# Patient Record
Sex: FEMALE | Race: WHITE | NOT HISPANIC OR LATINO | Employment: FULL TIME | URBAN - METROPOLITAN AREA
[De-identification: names, ages, dates, MRNs, and addresses within clinical notes are randomized per-mention and may not be internally consistent; named-entity substitution may affect disease eponyms.]

---

## 2020-12-03 ENCOUNTER — OFFICE VISIT (OUTPATIENT)
Dept: OBSTETRICS AND GYNECOLOGY | Facility: CLINIC | Age: 40
End: 2020-12-03

## 2020-12-03 VITALS
TEMPERATURE: 97.3 F | DIASTOLIC BLOOD PRESSURE: 60 MMHG | BODY MASS INDEX: 23.22 KG/M2 | WEIGHT: 136 LBS | HEIGHT: 64 IN | SYSTOLIC BLOOD PRESSURE: 98 MMHG

## 2020-12-03 DIAGNOSIS — Z30.430 ENCOUNTER FOR IUD INSERTION: Primary | ICD-10-CM

## 2020-12-03 LAB
B-HCG UR QL: NEGATIVE
INTERNAL NEGATIVE CONTROL: NEGATIVE
INTERNAL POSITIVE CONTROL: POSITIVE
Lab: NORMAL

## 2020-12-03 PROCEDURE — 58300 INSERT INTRAUTERINE DEVICE: CPT | Performed by: OBSTETRICS & GYNECOLOGY

## 2020-12-03 PROCEDURE — 58301 REMOVE INTRAUTERINE DEVICE: CPT | Performed by: OBSTETRICS & GYNECOLOGY

## 2020-12-03 PROCEDURE — 81025 URINE PREGNANCY TEST: CPT | Performed by: OBSTETRICS & GYNECOLOGY

## 2020-12-03 RX ORDER — ADALIMUMAB 40MG/0.8ML
KIT SUBCUTANEOUS
COMMUNITY
Start: 2020-11-02

## 2020-12-03 NOTE — PROGRESS NOTES
Procedure: IUD Removal and Reinsertion Procedure Note     Remove & Insert Drug Implant IUD    Date/Time: 12/3/2020 2:57 PM  Performed by: Allan Gage MD  Authorized by: Allan Gage MD   Preparation: Patient was prepped and draped in the usual sterile fashion.  Local anesthesia used: no    Anesthesia:  Local anesthesia used: no    Sedation:  Patient sedated: no    Patient tolerance: patient tolerated the procedure well with no immediate complications          Pre procedure Dx : 1) Removal of IUD                                    2) Insertion of New IUD    Post procedure Dx : 1) Removal of IUD                                    2) Insertion of New IUD      The risks, benefits, and alternatives to Kyleena and IUD removal were explained at length with the patient. All her questions were answered and consents were signed.    The patient was placed in a dorsal lithotomy position on the examining table in [facility]. A bimanual exam confirmed the uterus was normal in size, anteverted. A warmed metal speculum was inserted into the vagina and the cervix was brought into view.    Type of IUD:  Chelsy   Date of insertion:  Unknown, possibly 5 years ago  Reason for removal:  Device expiration    Procedure Time Out Documentation    Procedure Details  IUD strings visible:  yes  Removal:  IUD strings grasped and IUD removed intact with gentle traction.  The patient tolerated the procedure well.    Plans for contraception: New IUD     IUD used: Kyleena  NDC: Kyleena  [id number]  Lot #: [id number]  Exp Date: 06/2022  BH device    The cervix was prepped with Betadine. The anterior lip was grasped with a single-tooth tenaculum. The endometrial cavity was then sounded to 7 cm without use of a dilator. This sealed Kyleena package was opened and the IUD was removed in a sterile fashion.    The upper edge of the depth setting the flange was set at a uterine sound measured. The  was then carefully advanced to the  cervical canal into the uterus to the level of the fundus.  The slider was then retracted about 1 cm and deployed the device. The device was then gently advanced to the fundus. The IUD was then released by pulling the slider down all the way. The  was removed carefully from the uterus. The threads were then cut leaving 2-3 cm visible outside of the cervix.  The single-tooth tenaculum was removed from the anterior lip. Good hemostasis was noted.     All other instruments were removed from the vagina.   There were no complications.  The patient tolerated the procedure well with a minimal amount of discomfort.    The patient was counseled about the need to return in 4 weeks with U/S for IUD check.     She was counseled about the need to use a backup method of contraception such as condoms until her post insertion exam was performed. The patient verbalized understanding that the Kyleena will need to be removed/replaced after 5 years. The patient is counseled to contact us if she has any significant or increasing bleeding, pain, fever, chills, or other concerns. She is instructed to see a doctor right away if she believes that she may be pregnant at any time with the IUD in place.    Allan Gage MD  12/03/2020

## 2020-12-31 ENCOUNTER — OFFICE VISIT (OUTPATIENT)
Dept: OBSTETRICS AND GYNECOLOGY | Facility: CLINIC | Age: 40
End: 2020-12-31

## 2020-12-31 VITALS
BODY MASS INDEX: 23.9 KG/M2 | SYSTOLIC BLOOD PRESSURE: 98 MMHG | WEIGHT: 140 LBS | DIASTOLIC BLOOD PRESSURE: 62 MMHG | TEMPERATURE: 97.3 F | HEIGHT: 64 IN

## 2020-12-31 DIAGNOSIS — Z30.430 ENCOUNTER FOR IUD INSERTION: Primary | ICD-10-CM

## 2020-12-31 PROCEDURE — 99212 OFFICE O/P EST SF 10 MIN: CPT | Performed by: OBSTETRICS & GYNECOLOGY

## 2020-12-31 NOTE — PROGRESS NOTES
"Chief Complaint   Patient presents with   • IUD check         Subjective   HPI  Gisele Escobar is a 40 y.o. female, , who presents for IUD check follow up.  She had an IUD placed 4 weeks ago.  Her LMP was No LMP recorded (lmp unknown). Patient has had an implant..  Since the IUD placement, the patient has not had any unusual complaints. Additional complaints include none.    The additional following portions of the patient's history were reviewed and updated as appropriate: allergies, current medications, past family history, past medical history, past social history, past surgical history and problem list.    Did the patient have u/s today? No.    Review of Systems   Constitutional: Negative.    HENT: Negative.    Eyes: Negative.    Respiratory: Negative.    Cardiovascular: Negative.    Gastrointestinal: Negative.    Endocrine: Negative.    Genitourinary: Negative.  Negative for menstrual problem, pelvic pain, pelvic pressure and vaginal bleeding.   Musculoskeletal: Negative.    Skin: Negative.    Allergic/Immunologic: Negative.    Neurological: Negative.    Hematological: Negative.    Psychiatric/Behavioral: Negative.      All other systems reviewed and are negative.     I have reviewed and agree with the HPI, ROS, and historical information as entered above. Allan Gage MD    Objective   BP 98/62   Temp 97.3 °F (36.3 °C)   Ht 162.6 cm (64\")   Wt 63.5 kg (140 lb)   LMP  (LMP Unknown)   Breastfeeding No   BMI 24.03 kg/m²     Physical Exam  Vitals signs and nursing note reviewed.   Constitutional:       Appearance: Normal appearance.   Pulmonary:      Effort: Pulmonary effort is normal.   Abdominal:      General: Abdomen is flat.      Palpations: Abdomen is soft.   Genitourinary:     Vagina: Normal.      Cervix: Normal.      Uterus: Normal.       Adnexa: Right adnexa normal and left adnexa normal.      Comments: IUD string 3 cm.  Neurological:      Mental Status: She is alert and oriented to " person, place, and time.   Psychiatric:         Behavior: Behavior normal.         Assessment/Plan     Assessment     Problem List Items Addressed This Visit     Encounter for IUD insertion - Primary    Overview     Removal of Chelsy and insertion of Kyleena IUD on 12/3/2020.  Due for removal 12/2025.         Relevant Medications    levonorgestrel (KYLEENA) 19.5 MG IUD 1 each          1. IUD checkup.  No problems since Kyleena IUD insertion.  String is 3 cm long.    Plan     1. Return to office PRN  Return in 6 months (on 6/30/2021), or if symptoms worsen or fail to improve, for Annual physical.      Allan Gage MD  12/31/2020

## 2021-06-17 ENCOUNTER — OFFICE VISIT (OUTPATIENT)
Dept: OBSTETRICS AND GYNECOLOGY | Facility: CLINIC | Age: 41
End: 2021-06-17

## 2021-06-17 VITALS
DIASTOLIC BLOOD PRESSURE: 64 MMHG | TEMPERATURE: 97.7 F | WEIGHT: 128 LBS | HEIGHT: 64 IN | BODY MASS INDEX: 21.85 KG/M2 | SYSTOLIC BLOOD PRESSURE: 90 MMHG

## 2021-06-17 DIAGNOSIS — N89.8 VAGINAL DISCHARGE: ICD-10-CM

## 2021-06-17 DIAGNOSIS — Z00.00 ANNUAL PHYSICAL EXAM: Primary | ICD-10-CM

## 2021-06-17 DIAGNOSIS — B96.89 BV (BACTERIAL VAGINOSIS): ICD-10-CM

## 2021-06-17 DIAGNOSIS — N76.0 BV (BACTERIAL VAGINOSIS): ICD-10-CM

## 2021-06-17 LAB
KOH PREP NAIL: NORMAL
WET PREP GENITAL: POSITIVE

## 2021-06-17 PROCEDURE — 87220 TISSUE EXAM FOR FUNGI: CPT | Performed by: NURSE PRACTITIONER

## 2021-06-17 PROCEDURE — 99395 PREV VISIT EST AGE 18-39: CPT | Performed by: NURSE PRACTITIONER

## 2021-06-17 PROCEDURE — 87210 SMEAR WET MOUNT SALINE/INK: CPT | Performed by: NURSE PRACTITIONER

## 2021-06-17 RX ORDER — METRONIDAZOLE 500 MG/1
500 TABLET ORAL 2 TIMES DAILY
Qty: 14 TABLET | Refills: 0 | Status: SHIPPED | OUTPATIENT
Start: 2021-06-17 | End: 2021-06-24

## 2021-06-17 NOTE — PROGRESS NOTES
GYN Annual Exam     CC - Here for annual exam.        HPI  Gisele DALJIT Escobar is a 41 y.o. female, , who presents for annual well woman exam. No LMP recorded (lmp unknown). (Menstrual status: Other)..  She has no periods..  Dysmenorrhea:None.  .  There were no changes to her medical or surgical history since her last visit.. Partner Status: Marital Status: single.  She is sexually active. She has not had new partners since her last STD testing. She does desire STD testing. .    Additional OB/GYN History   Current contraception: contraceptive methods: IUD.  Insertion date: 20  Desires to: do not start contraception  Last Pap :   Last Completed Pap Smear          Ordered - PAP SMEAR (Every 3 Years) Ordered on 2021    06/10/2020  Done - ASCUS, HPV negative    10/27/2014  SCANNED - PAP SMEAR              History of abnormal Pap smear: no  Family history of uterine, colon, breast, or ovarian cancer: no  Performs monthly Self-Breast Exam: yes  Exercises Regularly:yes  Feelings of Anxiety or Depression: yes - both  Tobacco Usage?: No   OB History        1    Para        Term                AB   1    Living           SAB   1    TAB        Ectopic        Molar        Multiple        Live Births   0                Health Maintenance   Topic Date Due   • ANNUAL PHYSICAL  Never done   • TDAP/TD VACCINES (1 - Tdap) Never done   • Annual Gynecologic Pelvic and Breast Exam  10/28/2015   • HEPATITIS C SCREENING  Never done   • INFLUENZA VACCINE  2021   • PAP SMEAR  06/10/2023   • COVID-19 Vaccine  Completed   • Pneumococcal Vaccine 0-64  Aged Out       The additional following portions of the patient's history were reviewed and updated as appropriate: problem list.    Review of Systems   Constitutional: Negative.    Gastrointestinal: Negative.    Genitourinary: Negative.    Psychiatric/Behavioral: Negative.          I have reviewed and agree with the HPI, ROS, and historical information as  "entered above. Ashley Hernandez, APRN    Objective   BP 90/64   Temp 97.7 °F (36.5 °C)   Ht 162.6 cm (64\")   Wt 58.1 kg (128 lb)   LMP  (LMP Unknown)   BMI 21.97 kg/m²     Physical Exam  Exam conducted with a chaperone present.   Constitutional:       Appearance: Normal appearance. She is normal weight.   Cardiovascular:      Rate and Rhythm: Normal rate and regular rhythm.   Chest:      Breasts:         Right: Normal.         Left: Normal.   Abdominal:      Palpations: Abdomen is soft.   Genitourinary:     General: Normal vulva.      Vagina: Vaginal discharge present.      Cervix: Normal.      Uterus: Normal.       Adnexa: Right adnexa normal and left adnexa normal.      Rectum: Normal.      Comments: IUD strings visualized on exam    Thin white vaginal discharge   Neurological:      Mental Status: She is alert.            Assessment and Plan    Problem List Items Addressed This Visit     None      Visit Diagnoses     Annual physical exam    -  Primary    Relevant Orders    Pap IG, Ct-Ng TV Rfx HPV All          1. GYN annual well woman exam.   2. Doing well with Kyleena IUD  (insertion 12/2020)  3. + clue cells on wet prep, Rx flagyl as prescribed  4. Reviewed monthly self breast exams.  Instructed to call with lumps, pain, or breast discharge.    5. Reviewed exercise as a preventative health measures.   6. Reccommended Flu Vaccine in Fall of each year.  7. RTC in 1 year or PRN with problems      Ashley Hernandez, APRN  06/17/2021  "

## 2021-06-29 DIAGNOSIS — B37.9 YEAST INFECTION: Primary | ICD-10-CM

## 2021-06-29 RX ORDER — FLUCONAZOLE 150 MG/1
150 TABLET ORAL ONCE
Qty: 1 TABLET | Refills: 0 | Status: SHIPPED | OUTPATIENT
Start: 2021-06-29 | End: 2021-06-29

## 2024-09-08 PROBLEM — M19.90 INFLAMMATORY ARTHRITIS: Status: ACTIVE | Noted: 2024-09-08

## 2024-11-07 ENCOUNTER — LAB (OUTPATIENT)
Facility: HOSPITAL | Age: 44
End: 2024-11-07
Payer: COMMERCIAL

## 2024-11-07 ENCOUNTER — OFFICE VISIT (OUTPATIENT)
Age: 44
End: 2024-11-07
Payer: COMMERCIAL

## 2024-11-07 VITALS
TEMPERATURE: 97.6 F | HEART RATE: 67 BPM | HEIGHT: 64 IN | WEIGHT: 138.8 LBS | DIASTOLIC BLOOD PRESSURE: 72 MMHG | SYSTOLIC BLOOD PRESSURE: 114 MMHG | BODY MASS INDEX: 23.7 KG/M2

## 2024-11-07 DIAGNOSIS — M15.9 GENERALIZED OSTEOARTHROSIS, INVOLVING MULTIPLE SITES: ICD-10-CM

## 2024-11-07 DIAGNOSIS — M25.461 SWELLING OF RIGHT KNEE JOINT: ICD-10-CM

## 2024-11-07 DIAGNOSIS — Z79.899 HIGH RISK MEDICATION USE: ICD-10-CM

## 2024-11-07 DIAGNOSIS — Z87.81 HISTORY OF TIBIAL FRACTURE: ICD-10-CM

## 2024-11-07 DIAGNOSIS — D84.821 IMMUNOSUPPRESSION DUE TO DRUG THERAPY: ICD-10-CM

## 2024-11-07 DIAGNOSIS — Z79.899 IMMUNOSUPPRESSION DUE TO DRUG THERAPY: ICD-10-CM

## 2024-11-07 DIAGNOSIS — L40.50 PSORIATIC ARTHRITIS OF MULTIPLE JOINTS: Primary | ICD-10-CM

## 2024-11-07 DIAGNOSIS — L40.50 PSORIATIC ARTHRITIS OF MULTIPLE JOINTS: ICD-10-CM

## 2024-11-07 LAB
ALBUMIN SERPL-MCNC: 4.5 G/DL (ref 3.5–5.2)
ALBUMIN/GLOB SERPL: 1.6 G/DL
ALP SERPL-CCNC: 63 U/L (ref 39–117)
ALT SERPL W P-5'-P-CCNC: 38 U/L (ref 1–33)
ANION GAP SERPL CALCULATED.3IONS-SCNC: 11.5 MMOL/L (ref 5–15)
AST SERPL-CCNC: 35 U/L (ref 1–32)
BASOPHILS # BLD MANUAL: 0.14 10*3/MM3 (ref 0–0.2)
BASOPHILS NFR BLD MANUAL: 2 % (ref 0–1.5)
BILIRUB SERPL-MCNC: 0.4 MG/DL (ref 0–1.2)
BUN SERPL-MCNC: 11 MG/DL (ref 6–20)
BUN/CREAT SERPL: 16.7 (ref 7–25)
CALCIUM SPEC-SCNC: 9.2 MG/DL (ref 8.6–10.5)
CHLORIDE SERPL-SCNC: 101 MMOL/L (ref 98–107)
CO2 SERPL-SCNC: 25.5 MMOL/L (ref 22–29)
CREAT SERPL-MCNC: 0.66 MG/DL (ref 0.57–1)
CRP SERPL-MCNC: <0.3 MG/DL (ref 0–0.5)
DEPRECATED RDW RBC AUTO: 37.1 FL (ref 37–54)
EGFRCR SERPLBLD CKD-EPI 2021: 112.5 ML/MIN/1.73
EOSINOPHIL # BLD MANUAL: 0 10*3/MM3 (ref 0–0.4)
EOSINOPHIL NFR BLD MANUAL: 0 % (ref 0.3–6.2)
ERYTHROCYTE [DISTWIDTH] IN BLOOD BY AUTOMATED COUNT: 11.5 % (ref 12.3–15.4)
ERYTHROCYTE [SEDIMENTATION RATE] IN BLOOD: 5 MM/HR (ref 0–20)
GLOBULIN UR ELPH-MCNC: 2.9 GM/DL
GLUCOSE SERPL-MCNC: 70 MG/DL (ref 65–99)
HCT VFR BLD AUTO: 43.4 % (ref 34–46.6)
HGB BLD-MCNC: 14.7 G/DL (ref 12–15.9)
LYMPHOCYTES # BLD MANUAL: 3.17 10*3/MM3 (ref 0.7–3.1)
LYMPHOCYTES NFR BLD MANUAL: 9 % (ref 5–12)
MCH RBC QN AUTO: 30.4 PG (ref 26.6–33)
MCHC RBC AUTO-ENTMCNC: 33.9 G/DL (ref 31.5–35.7)
MCV RBC AUTO: 89.7 FL (ref 79–97)
MONOCYTES # BLD: 0.63 10*3/MM3 (ref 0.1–0.9)
NEUTROPHILS # BLD AUTO: 3.1 10*3/MM3 (ref 1.7–7)
NEUTROPHILS NFR BLD MANUAL: 44 % (ref 42.7–76)
PLAT MORPH BLD: NORMAL
PLATELET # BLD AUTO: 479 10*3/MM3 (ref 140–450)
PMV BLD AUTO: 9.7 FL (ref 6–12)
POIKILOCYTOSIS BLD QL SMEAR: ABNORMAL
POTASSIUM SERPL-SCNC: 4.2 MMOL/L (ref 3.5–5.2)
PROT SERPL-MCNC: 7.4 G/DL (ref 6–8.5)
RBC # BLD AUTO: 4.84 10*6/MM3 (ref 3.77–5.28)
SODIUM SERPL-SCNC: 138 MMOL/L (ref 136–145)
VARIANT LYMPHS NFR BLD MANUAL: 45 % (ref 19.6–45.3)
WBC MORPH BLD: NORMAL
WBC NRBC COR # BLD AUTO: 7.04 10*3/MM3 (ref 3.4–10.8)

## 2024-11-07 PROCEDURE — 80053 COMPREHEN METABOLIC PANEL: CPT

## 2024-11-07 PROCEDURE — 85007 BL SMEAR W/DIFF WBC COUNT: CPT

## 2024-11-07 PROCEDURE — 86140 C-REACTIVE PROTEIN: CPT

## 2024-11-07 PROCEDURE — 36415 COLL VENOUS BLD VENIPUNCTURE: CPT

## 2024-11-07 PROCEDURE — 85652 RBC SED RATE AUTOMATED: CPT

## 2024-11-07 PROCEDURE — 85025 COMPLETE CBC W/AUTO DIFF WBC: CPT

## 2024-11-07 RX ORDER — ADALIMUMAB 40MG/0.8ML
40 KIT SUBCUTANEOUS
Qty: 2 EACH | Refills: 5 | Status: SHIPPED | OUTPATIENT
Start: 2024-11-07

## 2024-11-07 NOTE — ASSESSMENT & PLAN NOTE
Started after being kicked by horse 2006  worked equine [employer]  Unsuccessful aspiration x3 since 2013.    Resolved on Humira

## 2024-11-07 NOTE — ASSESSMENT & PLAN NOTE
8/2021  Dr. Soo Parra -  ortho.  Patient with fracture of right tibia while riding a horse in 8/2021  She had surgery at  and a michelle was placed down tibia.    She required a wound vac after surgery  She has healed well from this injury

## 2024-11-07 NOTE — ASSESSMENT & PLAN NOTE
pips and left elbow and right knee.    -Has tried Celebrex in the past  -Declines addition oral NSAID  -Recommend topical diclofenac as needed

## 2024-11-07 NOTE — ASSESSMENT & PLAN NOTE
Humira  QuantiFERON - 3/4/2024  Hepatitis panel - 3/4/2024    Labs every 6 months for monitoring.  No sign of toxicity.  Humira well tolerated and effective  No serious infections   We discussed biologic agents at length. Risks and alternatives were discussed at length and the option of no treatment was also given. We discussed risks including but not limited to infections which can be unusual, severe, and deadly. When possible, these agents should be stopped immediately if infections occur. Unusual infection such as TB and fungal infections can occur. There may be an increased risk of lymphoma with these agents. Other risks can include a multiple sclerosis-like illness and worsening of heart failure. Infusion or injection reactions which can be deadly have been reported. Studies on pregnancy have not been done so pregnancy should be avoided while on these agents. Reactivation of a deadly brain virus and hepatitis viruses have been reported. Worsening of COPD has been seen with orencia. Elevated lipids, elevation in liver functions, and dangerous changes in blood counts have been seen with certain agents. Regular monitoring will be required.    Orders:    CBC Auto Differential; Future    Comprehensive Metabolic Panel; Future    Sedimentation Rate; Future    C-reactive Protein; Future

## 2024-11-07 NOTE — ASSESSMENT & PLAN NOTE
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      Probable psoriatic arthritis without psoriasis.    History of inflammatory arthritis with dactylitis.  Positive HLA B27  Much improved on Humira injection every 2 weeks.  No side effects.  Well tolerated.  Low dz activity; sjc 0 tjc 0 good prognosis  Humira refilled.  No signs of toxicity.  Labs today as below and every 6 months for monitoring.  Lab order provided  Return to clinic 6 months    Orders:    CBC Auto Differential; Future    Comprehensive Metabolic Panel; Future    Sedimentation Rate; Future    C-reactive Protein; Future

## 2024-11-07 NOTE — PROGRESS NOTES
Office Follow Up      Date: 11/07/2024   Patient Name: Gisele Escobar  MRN: [id number]  YOB: 1980    Referring Physician: No ref. provider found     Chief Complaint:   Chief Complaint   Patient presents with    Psoriatic Arthritis       History of Present Illness: Gisele Escobar is a 44 y.o. female who is here today for follow up on suspected psoriatic arthritis with inflammatory arthritis, dactylitis history, positive HLA B27.      Doing well overall.  Joints are doing well.  No swollen or painful joints.  Some aching in her Achilles tendons the time.  No swelling  Occasional numbness down her leg.  Continues on Humira which she started 2014.  Humira is very effective for her arthritis.  No serious infections. No side effects.     She fractured her pelvis after falling from a horse in June of 2019 requiring surgery at [facility].  She recovered well from this.    She fell off horse again in August 2021 riding a horse and had compound fracture of right tibia and had a michelle placed. She required a wound vac post-operatively.  She healed well from this.      She is not riding horses as much any longer.  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 Negative for chills, fever and unexpected weight loss.   HENT:  Negative for mouth sores, sinus pressure and sore throat.    Eyes:  Negative for pain and redness.   Respiratory:  Negative for cough and shortness of breath.    Cardiovascular:  Negative for chest pain.   Gastrointestinal:  Positive for abdominal pain. Negative for blood in stool, diarrhea, nausea, vomiting and GERD.   Endocrine: Negative for polydipsia and polyuria.   Genitourinary:  Negative for dysuria, genital sores and hematuria.   Musculoskeletal:  Negative for arthralgias, back pain, joint swelling, myalgias, neck pain and neck stiffness.   Skin:  Negative  "for rash and bruise.   Allergic/Immunologic: Negative for immunocompromised state.   Neurological:  Positive for numbness. Negative for seizures, weakness and memory problem.   Hematological:  Negative for adenopathy. Does not bruise/bleed easily.   Psychiatric/Behavioral:  Positive for depressed mood. The patient is not nervous/anxious.         Medications:   Current Outpatient Medications:     Calcium Carb-Cholecalciferol (CALCIUM 600 + D PO), Take 2 tablets by mouth Daily., Disp: , Rfl:     cholecalciferol 25 MCG (1000 UT) tablet, Take 1 tablet by mouth Daily., Disp: , Rfl:     CINNAMON PO, Take  by mouth., Disp: , Rfl:     FOLIC ACID PO, Take 1 tablet by mouth Daily., Disp: , Rfl:     Green Tea 150 MG capsule, Take 1 capsule by mouth Daily., Disp: , Rfl:     Adalimumab (Humira, 2 Pen,) 40 MG/0.8ML Auto-injector Kit, Inject 40 mg under the skin into the appropriate area as directed Every 14 (Fourteen) Days., Disp: 2 each, Rfl: 5    Current Facility-Administered Medications:     levonorgestrel (KYLEENA) 19.5 MG IUD 1 each, 1 each, Intrauterine, Once, Allan Gage MD    Allergies: No Known Allergies    I have reviewed and updated the patient's chief complaint, history of present illness, review of systems, past medical history, surgical history, family history, social history, medications and allergy list as appropriate.     Objective        Vital Signs:   Vitals:    11/07/24 1103   BP: 114/72   BP Location: Left arm   Patient Position: Sitting   Cuff Size: Adult   Pulse: 67   Temp: 97.6 °F (36.4 °C)   Weight: 63 kg (138 lb 12.8 oz)   Height: 162.6 cm (64\")   PainSc:   2     Body mass index is 23.82 kg/m².      Physical Exam:  Physical Exam   MUSCULOSKELETAL:   No peripheral synovitis  OA changes of PIPs and DIPs bilaterally  No warmth or swelling or effusion to the knee    Complete joint exam was performed including the MCPs, PIPs, DIPs of the hands, wrists, elbows, shoulders, hips, knees and ankles.  No " "soft tissue swelling or tenderness is present except as above.    General: The patient is well-developed and well nourished. Cooperative, alert and oriented. Affect is normal. Hydration appears normal.   HEENT: Normocephalic and atraumatic. Lids and conjunctiva are normal. Pupils are equal and sclera are clear. Oropharynx is clear   NECK neck is supple without adenopathy, masses or thyromegaly.   CARDIOVASCULAR: Regular rate and rhythm. No murmurs, rubs or gallops   LUNGS: Effort is normal. Lungs are clear bilateral   ABDOMEN: Not examined  EXTREMITIES: Peripheral pulses are intact. No clubbing.   SKIN: No rashes. No subcutaneous nodules. No digital ulcers. No sclerodactyly.   NEUROLOGIC: Gait is normal. Strength testing is normal.  No focal neurologic deficits    Results Review:   Labs:   Lab Results   Component Value Date    BUN 13 08/17/2021    CREATININE 0.67 08/17/2021    BCR 19 08/17/2021    K 4.2 08/17/2021    CO2 26 08/17/2021    CALCIUM 8.3 (L) 08/17/2021    ALBUMIN 4.1 08/16/2021    BILITOT 0.2 08/16/2021    AST 24 08/16/2021    ALT 24 08/16/2021     Lab Results   Component Value Date    WBC 10.40 (H) 08/17/2021    HGB 11.1 (L) 08/17/2021    HCT 33.2 (L) 08/17/2021    MCV 91 08/17/2021     08/17/2021     No results found for: \"SEDRATE\"  No results found for: \"CRP\"  No results found for: \"QUANTIFERO\", \"QUANTITB1\", \"QUANTITB2\", \"QUANTIFERN\", \"QUANTIFERM\", \"QUANTITBGLDP\"  No results found for: \"RF\"  No results found for: \"HEPBSAG\", \"HEPAIGM\", \"HEPBIGMCORE\", \"HEPCVIRUSABY\"      Procedures    Assessment / Plan      Assessment & Plan  Psoriatic arthritis of multiple joints  +HLA-B27; sero neg, crp 30s  **Current:  Humira 2014  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      Probable psoriatic arthritis without psoriasis.    History of inflammatory arthritis with dactylitis.  Positive HLA B27  Much improved on Humira injection every 2 weeks.  No side effects.  Well tolerated.  Low dz activity; sjc 0 tjc 0 good prognosis  Humira refilled.  No signs of toxicity.  Labs today as below and every 6 months for monitoring.  Lab order provided  Return to clinic 6 months    Orders:    CBC Auto Differential; Future    Comprehensive Metabolic Panel; Future    Sedimentation Rate; Future    C-reactive Protein; Future    High risk medication use  Humira  QuantiFERON - 3/4/2024  Hepatitis panel - 3/4/2024    Labs every 6 months for monitoring.  No sign of toxicity.  Humira well tolerated and effective  No serious infections   We discussed biologic agents at length. Risks and alternatives were discussed at length and the option of no treatment was also given. We discussed risks including but not limited to infections which can be unusual, severe, and deadly. When possible, these agents should be stopped immediately if infections occur. Unusual infection such as TB and fungal infections can occur. There may be an increased risk of lymphoma with these agents. Other risks can include a multiple sclerosis-like illness and worsening of heart failure. Infusion or injection reactions which can be deadly have been reported. Studies on pregnancy have not been done so pregnancy should be avoided while on these agents. Reactivation of a deadly brain virus and hepatitis viruses have been reported. Worsening of COPD has been seen with orencia. Elevated lipids, elevation in liver functions, and dangerous changes in blood counts have been seen with certain agents. Regular monitoring will be required.    Orders:    CBC Auto Differential; Future    Comprehensive Metabolic Panel; Future    Sedimentation Rate; Future    C-reactive Protein; Future    Immunosuppression due to drug therapy          Generalized osteoarthrosis, involving multiple sites  pips and left elbow and right knee.    -Has tried Celebrex in the past  -Declines addition oral NSAID  -Recommend topical diclofenac as needed       History of tibial fracture  8/2021  Dr. Soo Parra -  ortho.  Patient with fracture of right tibia while riding a horse in 8/2021  She had surgery at  and a michelle was placed down tibia.    She required a wound vac after surgery  She has healed well from this injury        Swelling of right knee joint  Started after being kicked by horse 2006  worked [facility]  Unsuccessful aspiration x3 since 2013.    Resolved on Humira           Follow Up:   Return in about 6 months (around 5/7/2025).        Mike Arteaga MD  [facility] [place]

## 2024-11-07 NOTE — PATIENT INSTRUCTIONS
Psoriatic Arthritis    Psoriatic arthritis, or PsA, is a long-term (chronic) condition that causes pain, swelling, and stiffness in the joints. The large joints of the legs, hips, and pelvis are most often affected but it can affect any joint in your body.    Most people with PsA have a chronic skin disease that causes itchy scales and patches to form on the skin (psoriasis) before they develop PsA. In some cases, a person may have PsA before or without having psoriasis.    PsA can be mild or severe. If untreated, PsA may cause joint damage.    What are the causes?    The exact cause of this condition is not known. PsA is an autoimmune disease. With this type of disease, the body's defense system (immune system) mistakenly attacks joints and the tissues that connect muscles to joints (tendons).    The disease may be activated by a trigger, such as:  Infections.  Stress.  Alcohol.    What increases the risk?    You are more likely to develop this condition if:  You have psoriasis.  You have a family history of psoriasis or PsA.  You are between the ages of 30 and 50.    What are the signs or symptoms?    The main symptom of this condition is inflammation of joints and tendons. Other symptoms may include:  Joint pain or swelling.  Joint stiffness, especially in the morning.  Swollen fingers and toes.  Pain in areas where tendons connect to bones.  Pitted and weak nails.  Tiredness (fatigue).  Eye redness.    Symptoms of this condition vary from person to person. Symptoms may come and go. Sometimes, symptoms get worse for a period of time. This is called a flare.    How is this diagnosed?    This condition may be diagnosed based on:  Your symptoms and medical history.  A physical exam.  Imaging tests such as an X-ray, a CT scan, or an MRI.  Blood tests to look for inflammation and to rule out other causes, such as gout or rheumatoid arthritis.    You may also be referred to a health care provider who specializes in  treating this condition (rheumatologist).    How is this treated?    The goal of treatment is to reduce pain and inflammation and to protect joints from damage. Treatment depends on how severe the inflammation is and how many joints are affected. Treatment may include:  Medicines.  NSAIDs, such as ibuprofen or naproxen.  Steroids. These can be taken by mouth or injected into a swollen joint.  Disease-modifying anti-rheumatic drugs (DMARDs). These slow the course of the disease.  Biologic medicines. These medicines are usually given as injections or through an IV. They can be very effective, but these medicines can increase the risk of developing infections.  Physical therapy and other exercises to:  Strengthen muscles that support joints.  Prevent joint stiffness.  Lifestyle changes. It is important to rest as needed, eat a healthy diet, and exercise.  A brace or splint to support a painful and swollen joint.  Surgery if you have severe joint damage.  Management of any associated medical conditions. Inflammation from PsA can affect other parts of the body, including the heart, eyes, or kidneys.    Follow these instructions at home:    Managing pain, stiffness, and swelling    If directed, put ice on painful areas. To do this:  If you have a removable brace or splint, remove it as told by your health care provider.  Put ice in a plastic bag.  Place a towel between your skin and the bag.  Leave the ice on for 20 minutes, 2-3 times a day.  Remove the ice if your skin turns bright red. This is very important. If you cannot feel pain, heat, or cold, you have a greater risk of damage to the area.    If you have a removable brace or splint:  Wear the brace or splint as told by your health care provider. Remove it only as told by your health care provider.  Check the skin around the brace or splint every day. Tell your health care provider about any concerns.  Loosen the brace or splint if your fingers or toes tingle,  become numb, or turn cold and blue.  Keep the brace or splint clean and dry.    Activity  Return to your normal activities as told by your health care provider. Ask your health care provider what activities are safe for you.  Get regular exercise. Ask your health care provider what type of exercise is best for you. Your health care provider may recommend:  Low-impact exercises such as walking, biking, or swimming.  Exercises that include stretching, such as jan chi and yoga.  Do not exercise when you have a flare of symptoms. Rest until the symptoms improve.    Lifestyle    Maintain a healthy weight. A healthy weight will help you stay active and take stress off your joints.  Eat a healthy diet that includes plenty of vegetables, fruits, whole grains, low-fat dairy products, and lean protein. Do not eat a lot of foods that are high in solid fats, added sugars, or salt.  Use techniques for stress reduction, such as meditation or yoga.  Do not use any products that contain nicotine or tobacco. These products include cigarettes, chewing tobacco, and vaping devices, such as e-cigarettes. If you need help quitting, ask your health care provider.  Consider joining a PsA support group.    General instructions  Take over-the-counter and prescription medicines only as told by your health care provider.  Keep a journal to help track your symptoms. Try to avoid any triggers.  Do not drink alcohol if your health care provider tells you not to drink.  See a mental health therapist if you feel sad, anxious, frustrated, and hopeless. Managing this condition can be challenging and you may feel overwhelmed and depressed.  Keep all follow-up visits. Your health care provider may monitor your condition over time to make sure that it does not cause problems or get worse.    Where to find support  National Psoriasis Foundation: www.psoriasis.org    Where to find more information  American Academy of Dermatology: www.aad.org  American  College of Rheumatology: www.rheumatology.org    Contact a health care provider if:  You have a fever or chills.  Your symptoms get worse.  You feel depressed, frustrated, and hopeless about your condition.    Get help right away if:  You have thoughts of hurting yourself or others.  Get help right away if you feel like you may hurt yourself or others, or have thoughts about taking your own life. Go to your nearest emergency room or:  Call 911.  Call the National Suicide Prevention Lifeline at 1-969.226.8835 or 736. This is open 24 hours a day.  Text the Crisis Text Line at 877180.    Summary  Psoriatic arthritis, or PsA, is a long-term condition that causes pain, swelling, and stiffness in the joints.  The goal of treatment is to reduce pain and inflammation and to protect joints from damage.  Treatment depends on how severe the inflammation is and how many joints are affected.    This information is not intended to replace advice given to you by your health care provider. Make sure you discuss any questions you have with your health care provider.  Document Revised: 02/06/2023 Document Reviewed: 02/06/2023  [employer] Patient Education © 2024 [employer] Adalimumab Injection    What is this medication?  ADALIMUMAB (ay da FITZPATRICK butch mab) treats autoimmune conditions, such as psoriasis, arthritis, Crohn's disease, and ulcerative colitis. It works by slowing down an overactive immune system. It belongs to a group of medications called TNF inhibitors. It is a monoclonal antibody.    This medicine may be used for other purposes; ask your health care provider or pharmacist if you have questions.  COMMON BRAND NAME(S): ABRILADA, AMJEVITA, CYLTEZO, HADLIMA, Hulio, Hulio PEN, Humira, Hyrimoz, Idacio, Yuflyma, YUSIMRY    What should I tell my care team before I take this medication?  They need to know if you have any of these conditions:  Cancer  Diabetes (high blood sugar)  Having surgery  Heart disease  Hepatitis B  Immune  system problems  Infections, such as tuberculosis (TB) or other bacterial, fungal, or viral infections  Multiple sclerosis  Recent or upcoming vaccine  An unusual or allergic reaction to adalimumab, mannitol, latex, rubber, other medications, foods, dyes, or preservatives  Pregnant or trying to get pregnant  Breast-feeding    How should I use this medication?  This medication is injected under the skin. It may be given by your care team in a hospital or clinic setting. It may also be given at home.  If you get this medication at home, you will be taught how to prepare and give it. Use exactly as directed. Take it as directed on the prescription label. Keep taking it unless your care team tells you to stop.    This medication comes with INSTRUCTIONS FOR USE. Ask your pharmacist for directions on how to use this medication. Read the information carefully. Talk to your pharmacist or care team if you have questions.    It is important that you put your used needles and syringes in a special sharps container. Do not put them in a trash can. If you do not have a sharps container, call your pharmacist or care team to get one.    A special MedGuide will be given to you by the pharmacist with each prescription and refill. If you are getting this medication in a hospital or clinic, a special MedGuide will be given to you before each treatment. Be sure to read this information carefully each time.    Talk to your care team about the use of this medication in children. While it be prescribed for children as young as 2 years for selected conditions, precautions do apply.    Overdosage: If you think you have taken too much of this medicine contact a poison control center or emergency room at once.  NOTE: This medicine is only for you. Do not share this medicine with others.    What if I miss a dose?  If you get this medication at the hospital or clinic: it is important not to miss your dose. Call your care team if you are unable  to keep an appointment.  If you give yourself this medication at home: If you miss a dose, take it as soon as you can. If it is almost time for your next dose, take only that dose. Do not take double or extra doses. Call your care team with questions.    What may interact with this medication?  Do not take this medication with any of the following:  Abatacept  Anakinra  Biologic medications, such as certolizumab, etanercept, golimumab, infliximab  Live virus vaccines  This medication may also interact with the following:  Cyclosporine  Theophylline  Vaccines  Warfarin  This list may not describe all possible interactions. Give your health care provider a list of all the medicines, herbs, non-prescription drugs, or dietary supplements you use. Also tell them if you smoke, drink alcohol, or use illegal drugs. Some items may interact with your medicine.    What should I watch for while using this medication?  Visit your care team for regular checks on your progress. Tell your care team if your symptoms do not start to get better or if they get worse.    You will be tested for tuberculosis (TB) before you start this medication. If your care team prescribes any medication for TB, you should start taking the TB medication before starting this medication. Make sure to finish the full course of TB medication.    This medication may increase your risk of getting an infection. Call your care team for advice if you get a fever, chills, sore throat, or other symptoms of a cold or flu. Do not treat yourself. Try to avoid being around people who are sick.    Talk to your care team about your risk of cancer. You may be more at risk for certain types of cancer if you take this medication.    What side effects may I notice from receiving this medication?  Side effects that you should report to your care team as soon as possible:  Allergic reactions--skin rash, itching, hives, swelling of the face, lips, tongue, or throat  Aplastic  anemia--unusual weakness or fatigue, dizziness, headache, trouble breathing, increased bleeding or bruising  Body pain, tingling, or numbness  Heart failure--shortness of breath, swelling of the ankles, feet, or hands, sudden weight gain, unusual weakness or fatigue  Infection--fever, chills, cough, sore throat, wounds that don't heal, pain or trouble when passing urine, general feeling of discomfort or being unwell  Lupus-like syndrome--joint pain, swelling, or stiffness, butterfly-shaped rash on the face, rashes that get worse in the sun, fever, unusual weakness or fatigue  Unusual bruising or bleeding    Side effects that usually do not require medical attention (report to your care team if they continue or are bothersome):  Headache  Nausea  Pain, redness, or irritation at injection site  Runny or stuffy nose  Sore throat  Stomach pain    This list may not describe all possible side effects. Call your doctor for medical advice about side effects. You may report side effects to FDA at 6-810-CGF-9306.    Where should I keep my medication?  Keep out of the reach of children and pets.    Store in the refrigerator. Do not freeze. Keep this medication in the original packaging until you are ready to take it. Protect from light. Get rid of any unused medication after the expiration date.    This medication may be stored at room temperature for up to 14 days. Keep this medication in the original packaging. Protect from light. If it is stored at room temperature, get rid of any unused medication after 14 days or after it expires, whichever is first.    To get rid of medications that are no longer needed or have :  Take the medication to a medication take-back program. Check with your pharmacy or law enforcement to find a location.  If you cannot return the medication, ask your pharmacist or care team how to get rid of this medication safely.    NOTE: This sheet is a summary. It may not cover all possible  information. If you have questions about this medicine, talk to your doctor, pharmacist, or health care provider.  © 2024 [employer]/Gold Standard (2023-03-03 00:00:00)

## 2025-03-27 ENCOUNTER — OFFICE VISIT (OUTPATIENT)
Dept: OBSTETRICS AND GYNECOLOGY | Facility: CLINIC | Age: 45
End: 2025-03-27
Payer: COMMERCIAL

## 2025-03-27 VITALS
BODY MASS INDEX: 23.73 KG/M2 | WEIGHT: 139 LBS | HEIGHT: 64 IN | DIASTOLIC BLOOD PRESSURE: 60 MMHG | SYSTOLIC BLOOD PRESSURE: 98 MMHG

## 2025-03-27 DIAGNOSIS — Z01.419 WELL WOMAN EXAM WITH ROUTINE GYNECOLOGICAL EXAM: Primary | ICD-10-CM

## 2025-03-27 DIAGNOSIS — Z30.431 IUD CHECK UP: ICD-10-CM

## 2025-03-27 DIAGNOSIS — Z12.31 BREAST CANCER SCREENING BY MAMMOGRAM: ICD-10-CM

## 2025-03-27 DIAGNOSIS — Z01.419 PAP TEST, AS PART OF ROUTINE GYNECOLOGICAL EXAMINATION: ICD-10-CM

## 2025-03-27 RX ORDER — BUPROPION HYDROCHLORIDE 150 MG/1
TABLET ORAL
COMMUNITY
Start: 2025-03-20

## 2025-03-27 NOTE — PROGRESS NOTES
"     Gynecologic Annual Exam Note          GYN Annual Exam     Establish Care and Gynecologic Exam        Subjective     HPI  Gisele Escobar is a 45 y.o. female, , who presents for annual well woman exam as a new patient.   No LMP recorded (lmp unknown). Patient has had an implant.  Her periods are absent secondary to birth control. The flow is absent. She denies dysmenorrhea. Marital Status: co-habitating. She is sexually active. She has not had new partners.. STD testing recommendations have been explained to the patient and she declines STD testing.    The patient would like to discuss the following complaints today: denies issues    Additional OB/GYN History   contraceptive methods: IUD.  Insertion date: 2020   Kyleena--- \"the smaller IUD\"  Desires to: continue contraception  History of migraines: yes with aura    Last Pap : 2021. Result: negative. HPV: not done     History of abnormal Pap smear: yes -  HPV non16/18 positive repeat WNL  Family history of uterine, colon, breast, or ovarian cancer: yes - no  Performs monthly Self-Breast Exam: no  Last mammogram: never.     Colonoscopy: has never had a colonoscopy or cologuard  Exercises Regularly: yes  Feelings of Anxiety or Depression: yes - managed with medication  Tobacco Usage?: No       Current Outpatient Medications:     Adalimumab (Humira, 2 Pen,) 40 MG/0.8ML Auto-injector Kit, Inject 40 mg under the skin into the appropriate area as directed Every 14 (Fourteen) Days., Disp: 2 each, Rfl: 5    buPROPion XL (WELLBUTRIN XL) 150 MG 24 hr tablet, , Disp: , Rfl:     cholecalciferol 25 MCG (1000 UT) tablet, Take 1 tablet by mouth Daily., Disp: , Rfl:     CINNAMON PO, Take  by mouth., Disp: , Rfl:     FLUoxetine (PROzac) 20 MG capsule, 1 capsule., Disp: , Rfl:     Green Tea 150 MG capsule, Take 1 capsule by mouth Daily., Disp: , Rfl:     Calcium Carb-Cholecalciferol (CALCIUM 600 + D PO), Take 2 tablets by mouth Daily. (Patient not taking: " Reported on 3/27/2025), Disp: , Rfl:     FOLIC ACID PO, Take 1 tablet by mouth Daily., Disp: , Rfl:     Current Facility-Administered Medications:     levonorgestrel (KYLEENA) 19.5 MG IUD 1 each, 1 each, Intrauterine, Once, Allan Gage MD     Patient denies the need for medication refills today.    OB History          1    Para        Term                AB   1    Living             SAB   1    IAB        Ectopic        Molar        Multiple        Live Births   0                Past Medical History:   Diagnosis Date    Abnormal Pap smear of cervix     Arthritis, rheumatoid     Autoimmune disorder     Blighted ovum 2010    Fatigue     Gastritis     High risk medication use     Inflammatory arthritis     Jaw pain     LFT elevation     Osteoarthritis     Psoriatic arthritis     Rheumatoid arthritis     Swelling of right knee joint         Past Surgical History:   Procedure Laterality Date    DILATATION AND CURETTAGE      blighted ovum    FEMUR SURGERY Left     PLATE    HIP SURGERY      x1    INTRAUTERINE DEVICE INSERTION  2020    Kyleena; due for removal 2025    PELVIC FRACTURE SURGERY      x2    TIBIA FRACTURE SURGERY      PHILIP PLACED IN R TIBIA       Health Maintenance   Topic Date Due    Pneumococcal Vaccine 0-49 (1 of 2 - PCV) Never done    Annual Gynecologic Pelvic and Breast Exam  10/28/2015    HEPATITIS C SCREENING  Never done    MAMMOGRAM  Never done    ANNUAL PHYSICAL  2022    PAP SMEAR  2024    INFLUENZA VACCINE  Never done    COVID-19 Vaccine ( - - season) 2024    TDAP/TD VACCINES (2 - Td or Tdap) 2031       The additional following portions of the patient's history were reviewed and updated as appropriate: allergies, current medications, past family history, past medical history, past social history, past surgical history, and problem list.    Review of Systems   Constitutional: Negative.    HENT: Negative.     Eyes: Negative.    Respiratory:  "Negative.     Cardiovascular: Negative.    Gastrointestinal: Negative.    Endocrine: Negative.    Genitourinary: Negative.    Musculoskeletal: Negative.    Skin: Negative.    Allergic/Immunologic: Negative.    Neurological: Negative.    Hematological: Negative.    Psychiatric/Behavioral: Negative.           I have reviewed and agree with the HPI, ROS, and historical information as entered above. Lexis Lee, APRN          Objective   BP 98/60   Ht 162.6 cm (64\")   Wt 63 kg (139 lb)   LMP  (LMP Unknown)   BMI 23.86 kg/m²     Physical Exam  Vitals and nursing note reviewed. Exam conducted with a chaperone present.   Constitutional:       General: She is not in acute distress.     Appearance: Normal appearance. She is well-developed. She is not ill-appearing.   Neck:      Thyroid: No thyroid mass or thyromegaly.   Pulmonary:      Effort: Pulmonary effort is normal. No respiratory distress or retractions.   Chest:      Chest wall: No mass.   Breasts:     Right: Normal. No mass, nipple discharge, skin change or tenderness.      Left: Normal. No mass, nipple discharge, skin change or tenderness.   Abdominal:      General: There is no distension.      Palpations: Abdomen is soft. Abdomen is not rigid. There is no mass.      Tenderness: There is no abdominal tenderness. There is no guarding or rebound.      Hernia: No hernia is present. There is no hernia in the left inguinal area.   Genitourinary:     General: Normal vulva.      Labia:         Right: No rash, tenderness or lesion.         Left: No rash, tenderness or lesion.       Vagina: Normal. No vaginal discharge or lesions.      Cervix: Normal.      Uterus: Normal. Not enlarged, not fixed and not tender.       Adnexa: Right adnexa normal and left adnexa normal.        Right: No mass or tenderness.          Left: No mass or tenderness.        Rectum: No external hemorrhoid.      Comments: Iud strings noted  Musculoskeletal:      Cervical back: No muscular " tenderness.   Skin:     General: Skin is warm and dry.   Neurological:      Mental Status: She is alert and oriented to person, place, and time.   Psychiatric:         Mood and Affect: Mood normal.         Behavior: Behavior normal.            Assessment and Plan    Problem List Items Addressed This Visit    None  Visit Diagnoses         Well woman exam with routine gynecological exam    -  Primary    Relevant Orders    LIQUID-BASED PAP SMEAR WITH HPV GENOTYPING REGARDLESS OF INTERPRETATION ([facility])      Breast cancer screening by mammogram        Relevant Orders    Mammo Screening Digital Tomosynthesis Bilateral With CAD      IUD check up          Pap test, as part of routine gynecological examination                GYN annual well woman exam.   Pap guidelines reviewed.  Reviewed monthly self breast exams.  Instructed to call with lumps, pain, or breast discharge.    Ordered Mammogram today  Reviewed exercise as a preventative health measures.   Reccommended Flu Vaccine in Fall of each year.  RTC in 1 year or PRN with problems.  Return in about 1 year (around 3/27/2026) for Annual physical.   Kyleena expires 12/2025.  RTO for switch out.    Lexis Lee, APRN  03/27/2025

## 2025-04-07 LAB — REF LAB TEST METHOD: NORMAL

## 2025-05-14 ENCOUNTER — TELEPHONE (OUTPATIENT)
Age: 45
End: 2025-05-14
Payer: COMMERCIAL

## 2025-05-14 NOTE — TELEPHONE ENCOUNTER
File Link    Scan on 5/13/2025 1618 by [employer], [place]: JUSTIN VELA,5/13/25        Key Information    Document ID File Type Document Type Description   [id number] Image REFERRAL/PRE-AUTH CSN - SCAN JUSTIN VELA,5/13/25     Import Information    Attached At Date Time User Dept   Encounter Level 5/13/2025  4:18 PM [employer], [place]      Encounter    Scanned Document on 5/13/25 with [employer], [place]

## 2025-05-14 NOTE — TELEPHONE ENCOUNTER
PA request has been approved and pharmacy notified (Filling pharmacy will be notified by phone, fax, or submitted prescription)    Authorized Medication: Humira  Name of Insurance Approving PA: Timmy  Pharmacy PA Number: [id number]  PA Effective Dates: 5.14.25-5.14.26  Dispensing Pharmacy: [facility]

## 2025-05-14 NOTE — TELEPHONE ENCOUNTER
Prior authorization initiated by [facility].  Update will be provided when a determination has been received.     Medication: Humira  PA Submission Method: CMM  Case Number/CMM Key: [id number]

## 2025-05-29 ENCOUNTER — LAB (OUTPATIENT)
Facility: HOSPITAL | Age: 45
End: 2025-05-29
Payer: COMMERCIAL

## 2025-05-29 ENCOUNTER — OFFICE VISIT (OUTPATIENT)
Age: 45
End: 2025-05-29
Payer: COMMERCIAL

## 2025-05-29 VITALS
BODY MASS INDEX: 23.99 KG/M2 | HEART RATE: 80 BPM | HEIGHT: 64 IN | SYSTOLIC BLOOD PRESSURE: 118 MMHG | DIASTOLIC BLOOD PRESSURE: 64 MMHG | WEIGHT: 140.5 LBS | TEMPERATURE: 97.7 F

## 2025-05-29 DIAGNOSIS — D84.821 IMMUNOSUPPRESSION DUE TO DRUG THERAPY: ICD-10-CM

## 2025-05-29 DIAGNOSIS — Z79.899 HIGH RISK MEDICATION USE: ICD-10-CM

## 2025-05-29 DIAGNOSIS — L40.50 PSORIATIC ARTHRITIS OF MULTIPLE JOINTS: ICD-10-CM

## 2025-05-29 DIAGNOSIS — L40.50 PSORIATIC ARTHRITIS OF MULTIPLE JOINTS: Primary | ICD-10-CM

## 2025-05-29 DIAGNOSIS — Z79.899 IMMUNOSUPPRESSION DUE TO DRUG THERAPY: ICD-10-CM

## 2025-05-29 LAB
ALBUMIN SERPL-MCNC: 4.3 G/DL (ref 3.5–5.2)
ALBUMIN/GLOB SERPL: 1.4 G/DL
ALP SERPL-CCNC: 75 U/L (ref 39–117)
ALT SERPL W P-5'-P-CCNC: 47 U/L (ref 1–33)
ANION GAP SERPL CALCULATED.3IONS-SCNC: 8.2 MMOL/L (ref 5–15)
AST SERPL-CCNC: 44 U/L (ref 1–32)
BASOPHILS # BLD AUTO: 0.05 10*3/MM3 (ref 0–0.2)
BASOPHILS NFR BLD AUTO: 0.8 % (ref 0–1.5)
BILIRUB SERPL-MCNC: 0.3 MG/DL (ref 0–1.2)
BUN SERPL-MCNC: 14 MG/DL (ref 6–20)
BUN/CREAT SERPL: 18.9 (ref 7–25)
CALCIUM SPEC-SCNC: 9.5 MG/DL (ref 8.6–10.5)
CHLORIDE SERPL-SCNC: 103 MMOL/L (ref 98–107)
CO2 SERPL-SCNC: 26.8 MMOL/L (ref 22–29)
CREAT SERPL-MCNC: 0.74 MG/DL (ref 0.57–1)
CRP SERPL-MCNC: <0.3 MG/DL (ref 0–0.5)
DEPRECATED RDW RBC AUTO: 40.6 FL (ref 37–54)
EGFRCR SERPLBLD CKD-EPI 2021: 103.1 ML/MIN/1.73
EOSINOPHIL # BLD AUTO: 0.21 10*3/MM3 (ref 0–0.4)
EOSINOPHIL NFR BLD AUTO: 3.5 % (ref 0.3–6.2)
ERYTHROCYTE [DISTWIDTH] IN BLOOD BY AUTOMATED COUNT: 11.8 % (ref 12.3–15.4)
ERYTHROCYTE [SEDIMENTATION RATE] IN BLOOD: 6 MM/HR (ref 0–20)
GLOBULIN UR ELPH-MCNC: 3 GM/DL
GLUCOSE SERPL-MCNC: 81 MG/DL (ref 65–99)
HCT VFR BLD AUTO: 43.8 % (ref 34–46.6)
HGB BLD-MCNC: 14.3 G/DL (ref 12–15.9)
IMM GRANULOCYTES # BLD AUTO: 0.01 10*3/MM3 (ref 0–0.05)
IMM GRANULOCYTES NFR BLD AUTO: 0.2 % (ref 0–0.5)
LYMPHOCYTES # BLD AUTO: 2.45 10*3/MM3 (ref 0.7–3.1)
LYMPHOCYTES NFR BLD AUTO: 41 % (ref 19.6–45.3)
MCH RBC QN AUTO: 30.3 PG (ref 26.6–33)
MCHC RBC AUTO-ENTMCNC: 32.6 G/DL (ref 31.5–35.7)
MCV RBC AUTO: 92.8 FL (ref 79–97)
MONOCYTES # BLD AUTO: 0.53 10*3/MM3 (ref 0.1–0.9)
MONOCYTES NFR BLD AUTO: 8.9 % (ref 5–12)
NEUTROPHILS NFR BLD AUTO: 2.73 10*3/MM3 (ref 1.7–7)
NEUTROPHILS NFR BLD AUTO: 45.6 % (ref 42.7–76)
NRBC BLD AUTO-RTO: 0 /100 WBC (ref 0–0.2)
PLATELET # BLD AUTO: 401 10*3/MM3 (ref 140–450)
PMV BLD AUTO: 9.7 FL (ref 6–12)
POTASSIUM SERPL-SCNC: 4.2 MMOL/L (ref 3.5–5.2)
PROT SERPL-MCNC: 7.3 G/DL (ref 6–8.5)
RBC # BLD AUTO: 4.72 10*6/MM3 (ref 3.77–5.28)
SODIUM SERPL-SCNC: 138 MMOL/L (ref 136–145)
WBC NRBC COR # BLD AUTO: 5.98 10*3/MM3 (ref 3.4–10.8)

## 2025-05-29 PROCEDURE — 86480 TB TEST CELL IMMUN MEASURE: CPT

## 2025-05-29 PROCEDURE — 86140 C-REACTIVE PROTEIN: CPT

## 2025-05-29 PROCEDURE — 85652 RBC SED RATE AUTOMATED: CPT

## 2025-05-29 PROCEDURE — 36415 COLL VENOUS BLD VENIPUNCTURE: CPT

## 2025-05-29 PROCEDURE — 99214 OFFICE O/P EST MOD 30 MIN: CPT | Performed by: INTERNAL MEDICINE

## 2025-05-29 PROCEDURE — 85025 COMPLETE CBC W/AUTO DIFF WBC: CPT

## 2025-05-29 PROCEDURE — 80053 COMPREHEN METABOLIC PANEL: CPT

## 2025-05-29 RX ORDER — ADALIMUMAB 40MG/0.8ML
40 KIT SUBCUTANEOUS
Qty: 2 EACH | Refills: 5 | Status: SHIPPED | OUTPATIENT
Start: 2025-05-29

## 2025-05-29 NOTE — PATIENT INSTRUCTIONS
Adalimumab Injection    What is this medication?  ADALIMUMAB (ay da FITZPATRICK butch mab) treats autoimmune conditions, such as psoriasis, arthritis, Crohn's disease, and ulcerative colitis. It works by slowing down an overactive immune system. It belongs to a group of medications called TNF inhibitors. It is a monoclonal antibody.    This medicine may be used for other purposes; ask your health care provider or pharmacist if you have questions.  COMMON BRAND NAME(S): ABRILADA, AMJEVITA, CYLTEZO, HADLIMA, Hulio, Hulio PEN, Humira, Hyrimoz, Idacio, Yuflyma, YUSIMRY    What should I tell my care team before I take this medication?  They need to know if you have any of these conditions:  Cancer  Diabetes (high blood sugar)  Having surgery  Heart disease  Hepatitis B  Immune system problems  Infections, such as tuberculosis (TB) or other bacterial, fungal, or viral infections  Multiple sclerosis  Recent or upcoming vaccine  An unusual or allergic reaction to adalimumab, mannitol, latex, rubber, other medications, foods, dyes, or preservatives  Pregnant or trying to get pregnant  Breast-feeding    How should I use this medication?  This medication is injected under the skin. It may be given by your care team in a hospital or clinic setting. It may also be given at home.  If you get this medication at home, you will be taught how to prepare and give it. Use exactly as directed. Take it as directed on the prescription label. Keep taking it unless your care team tells you to stop.    This medication comes with INSTRUCTIONS FOR USE. Ask your pharmacist for directions on how to use this medication. Read the information carefully. Talk to your pharmacist or care team if you have questions.    It is important that you put your used needles and syringes in a special sharps container. Do not put them in a trash can. If you do not have a sharps container, call your pharmacist or care team to get one.    A special MedGuide will be given to  you by the pharmacist with each prescription and refill. If you are getting this medication in a hospital or clinic, a special MedGuide will be given to you before each treatment. Be sure to read this information carefully each time.    Talk to your care team about the use of this medication in children. While it be prescribed for children as young as 2 years for selected conditions, precautions do apply.    Overdosage: If you think you have taken too much of this medicine contact a poison control center or emergency room at once.  NOTE: This medicine is only for you. Do not share this medicine with others.    What if I miss a dose?  If you get this medication at the hospital or clinic: it is important not to miss your dose. Call your care team if you are unable to keep an appointment.  If you give yourself this medication at home: If you miss a dose, take it as soon as you can. If it is almost time for your next dose, take only that dose. Do not take double or extra doses. Call your care team with questions.    What may interact with this medication?  Do not take this medication with any of the following:  Abatacept  Anakinra  Biologic medications, such as certolizumab, etanercept, golimumab, infliximab  Live virus vaccines  This medication may also interact with the following:  Cyclosporine  Theophylline  Vaccines  Warfarin  This list may not describe all possible interactions. Give your health care provider a list of all the medicines, herbs, non-prescription drugs, or dietary supplements you use. Also tell them if you smoke, drink alcohol, or use illegal drugs. Some items may interact with your medicine.    What should I watch for while using this medication?  Visit your care team for regular checks on your progress. Tell your care team if your symptoms do not start to get better or if they get worse.    You will be tested for tuberculosis (TB) before you start this medication. If your care team prescribes any  medication for TB, you should start taking the TB medication before starting this medication. Make sure to finish the full course of TB medication.    This medication may increase your risk of getting an infection. Call your care team for advice if you get a fever, chills, sore throat, or other symptoms of a cold or flu. Do not treat yourself. Try to avoid being around people who are sick.    Talk to your care team about your risk of cancer. You may be more at risk for certain types of cancer if you take this medication.    What side effects may I notice from receiving this medication?  Side effects that you should report to your care team as soon as possible:  Allergic reactions--skin rash, itching, hives, swelling of the face, lips, tongue, or throat  Aplastic anemia--unusual weakness or fatigue, dizziness, headache, trouble breathing, increased bleeding or bruising  Body pain, tingling, or numbness  Heart failure--shortness of breath, swelling of the ankles, feet, or hands, sudden weight gain, unusual weakness or fatigue  Infection--fever, chills, cough, sore throat, wounds that don't heal, pain or trouble when passing urine, general feeling of discomfort or being unwell  Lupus-like syndrome--joint pain, swelling, or stiffness, butterfly-shaped rash on the face, rashes that get worse in the sun, fever, unusual weakness or fatigue  Unusual bruising or bleeding    Side effects that usually do not require medical attention (report to your care team if they continue or are bothersome):  Headache  Nausea  Pain, redness, or irritation at injection site  Runny or stuffy nose  Sore throat  Stomach pain    This list may not describe all possible side effects. Call your doctor for medical advice about side effects. You may report side effects to FDA at 6-188-FDA-9268.    Where should I keep my medication?  Keep out of the reach of children and pets.    Store in the refrigerator. Do not freeze. Keep this medication in the  original packaging until you are ready to take it. Protect from light. Get rid of any unused medication after the expiration date.    This medication may be stored at room temperature for up to 14 days. Keep this medication in the original packaging. Protect from light. If it is stored at room temperature, get rid of any unused medication after 14 days or after it expires, whichever is first.    To get rid of medications that are no longer needed or have :  Take the medication to a medication take-back program. Check with your pharmacy or law enforcement to find a location.  If you cannot return the medication, ask your pharmacist or care team how to get rid of this medication safely.    NOTE: This sheet is a summary. It may not cover all possible information. If you have questions about this medicine, talk to your doctor, pharmacist, or health care provider.  ©  [employer]vier/Gold Standard (2023 00:00:00)

## 2025-05-29 NOTE — PROGRESS NOTES
Office Follow Up      Date: 05/29/2025   Patient Name: Gisele Escobar  MRN: [id number]  YOB: 1980    Referring Physician: No ref. provider found     Chief Complaint:   Chief Complaint   Patient presents with    Psoriatic Arthritis       History of Present Illness: Gisele Escobar is a 45 y.o. female who is here today for follow up on suspected psoriatic arthritis with inflammatory arthritis, dactylitis history, positive HLA B27.       Continues on Humira which she started 2014.  Humira is very effective for her arthritis.  No serious infections. No side effects.   She ran out of Humira about a month ago.  Increased arthralgias without the Humira.  No swollen joints     She has had multiple horse related injuries through the years  She fractured her pelvis after falling from a horse in June of 2019 requiring surgery at [facility].  She recovered well from this.     She fell off horse again in August 2021 riding a horse and had compound fracture of right tibia and had a michelle placed. She required a wound vac post-operatively.  She healed well from this.       She rides her horse dinah dog frequently.  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 Negative for chills, fever and unexpected weight loss.   HENT:  Negative for mouth sores, sinus pressure and sore throat.    Eyes:  Negative for pain and redness.   Respiratory:  Negative for cough and shortness of breath.    Cardiovascular:  Negative for chest pain.   Gastrointestinal:  Negative for abdominal pain, blood in stool, diarrhea, nausea, vomiting and GERD.   Endocrine: Negative for polydipsia and polyuria.   Genitourinary:  Negative for dysuria, genital sores and hematuria.   Musculoskeletal:  Positive for arthralgias, back pain and myalgias. Negative for joint swelling, neck pain  and neck stiffness.   Skin:  Negative for rash and bruise.   Allergic/Immunologic: Positive for environmental allergies.   Neurological:  Negative for seizures, weakness, numbness and memory problem.   Hematological:  Negative for adenopathy. Does not bruise/bleed easily.   Psychiatric/Behavioral:  Positive for dysphoric mood, depressed mood and stress. The patient is not nervous/anxious.         Past Medical History:   Past Medical History:   Diagnosis Date    Abnormal Pap smear of cervix     Arthritis, rheumatoid     Autoimmune disorder     Blighted ovum 2010    Fatigue     Gastritis     High risk medication use     Inflammatory arthritis     Jaw pain     LFT elevation     Osteoarthritis     Psoriatic arthritis     Rheumatoid arthritis     Swelling of right knee joint        Past Surgical History:   Past Surgical History:   Procedure Laterality Date    DILATATION AND CURETTAGE  2010    blighted ovum    FEMUR SURGERY Left     PLATE    HIP SURGERY      x1    INTRAUTERINE DEVICE INSERTION  12/03/2020    Kyleena; due for removal 12/2025    PELVIC FRACTURE SURGERY      x2    TIBIA FRACTURE SURGERY      PHILIP PLACED IN R TIBIA       Family History:   Family History   Problem Relation Age of Onset    Diabetes Maternal Grandmother     Hypertension Maternal Grandmother     Osteoarthritis Maternal Grandmother     Diabetes Maternal Grandfather     Hypertension Maternal Grandfather     Lung cancer Maternal Grandfather        Social History:   Social History     Socioeconomic History    Marital status: Single   Tobacco Use    Smoking status: Never    Smokeless tobacco: Never   Vaping Use    Vaping status: Never Used   Substance and Sexual Activity    Alcohol use: Yes    Drug use: Never    Sexual activity: Yes     Birth control/protection: I.U.D.       Medications:   Current Outpatient Medications:     Adalimumab (Humira, 2 Pen,) 40 MG/0.8ML Auto-injector Kit, Inject 40 mg under the skin into the appropriate area as directed  "Every 14 (Fourteen) Days., Disp: 2 each, Rfl: 5    buPROPion XL (WELLBUTRIN XL) 150 MG 24 hr tablet, , Disp: , Rfl:     Calcium Carb-Cholecalciferol (CALCIUM 600 + D PO), Take 2 tablets by mouth Daily., Disp: , Rfl:     cholecalciferol 25 MCG (1000 UT) tablet, Take 1 tablet by mouth Daily., Disp: , Rfl:     CINNAMON PO, Take 1 capsule by mouth Daily., Disp: , Rfl:     FLUoxetine (PROzac) 20 MG capsule, 1 capsule., Disp: , Rfl:     FOLIC ACID PO, Take 1 tablet by mouth Daily., Disp: , Rfl:     Green Tea 150 MG capsule, Take 1 capsule by mouth Daily., Disp: , Rfl:     Current Facility-Administered Medications:     levonorgestrel (KYLEENA) 19.5 MG IUD 1 each, 1 each, Intrauterine, Once, Allan Gage MD    Allergies: No Known Allergies        Objective        Vital Signs:   Vitals:    05/29/25 0851   BP: 118/64   BP Location: Left arm   Patient Position: Sitting   Cuff Size: Adult   Pulse: 80   Temp: 97.7 °F (36.5 °C)   Weight: 63.7 kg (140 lb 8 oz)   Height: 162.6 cm (64\")   PainSc: 6      Body mass index is 24.12 kg/m².      Physical Exam:  Physical Exam   MUSCULOSKELETAL:   No peripheral synovitis.  No dactylitis.  No pitting of the nails  OA changes of PIPs and DIPs bilaterally  No warmth or swelling or effusion to the knees    Complete joint exam was performed including the MCPs, PIPs, DIPs of the hands, wrists, elbows, shoulders, hips, knees and ankles.  No soft tissue swelling or tenderness is present except as above.    General: The patient is well-developed and well nourished. Cooperative, alert and oriented. Affect is normal. Hydration appears normal.   HEENT: Normocephalic and atraumatic. Lids and conjunctiva are normal. Pupils are equal and sclera are clear. Oropharynx is clear   NECK neck is supple without adenopathy, masses or thyromegaly.   CARDIOVASCULAR: Regular rate and rhythm. No murmurs, rubs or gallops   LUNGS: Effort is normal. Lungs are clear bilateral   ABDOMEN: Not examined  EXTREMITIES: " "Peripheral pulses are intact. No clubbing.   SKIN: No rashes. No subcutaneous nodules. No digital ulcers. No sclerodactyly.   NEUROLOGIC: Gait is normal. Strength testing is normal.  No focal neurologic deficits    Results Review:   Labs:   Lab Results   Component Value Date    GLUCOSE 70 11/07/2024    BUN 11 11/07/2024    CREATININE 0.66 11/07/2024    EGFR 112.5 11/07/2024    BCR 16.7 11/07/2024    K 4.2 11/07/2024    CO2 25.5 11/07/2024    CALCIUM 9.2 11/07/2024    ALBUMIN 4.5 11/07/2024    BILITOT 0.4 11/07/2024    AST 35 (H) 11/07/2024    ALT 38 (H) 11/07/2024     Lab Results   Component Value Date    WBC 7.04 11/07/2024    HGB 14.7 11/07/2024    HCT 43.4 11/07/2024    MCV 89.7 11/07/2024     (H) 11/07/2024     Lab Results   Component Value Date    SEDRATE 5 11/07/2024     Lab Results   Component Value Date    CRP <0.30 11/07/2024     No results found for: \"QUANTIFERO\", \"QUANTITB1\", \"QUANTITB2\", \"QUANTIFERN\", \"QUANTIFERM\", \"QUANTITBGLDP\"  No results found for: \"RF\"  No results found for: \"HEPBSAG\", \"HEPAIGM\", \"HEPBIGMCORE\", \"HEPCVIRUSABY\"      Procedures    Assessment / Plan      -Psoriatic arthritis  +HLA-B27; sero neg, crp 30s  dactylitis left 2nd finger spring 2013  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;ballroom dancing; horse dinah dog (grace lasso)  Multiple horse related injuries: Broke left elbow; right knee injured 2006  Pelvis fracture requiring pinning left hip and pelvis  summer 2019    **Current Rx:  Humira 2014  no help mtx 1.2.14, celecoxib.      Probable psoriatic arthritis without psoriasis.    History of inflammatory arthritis with dactylitis.  Positive HLA B27  Overall significantly improved on Humira injection every 2 weeks.  No side effects.  Well tolerated.  She ran out of Humira 4 weeks ago due to lack of labs and follow-up.  She is taking over the joints without it, but no active synovitis today  Low dz activity; sjc 0 tjc 0 good prognosis  Continue Humira.  Humira refilled.  No signs of " toxicity.  Labs today as below and every 6 months for monitoring.  Lab order provided  Return to clinic 6 months        -High risk medication use  -Immunosuppression due to drug therapy  Humira  QuantiFERON - 3/4/2024, update 5/29/2025  Hepatitis panel - 3/4/2024     Labs every 6 months for monitoring.  No sign of toxicity.  Humira well tolerated and effective  No serious infections   We discussed biologic agents at length. Risks and alternatives were discussed at length and the option of no treatment was also given. We discussed risks including but not limited to infections which can be unusual, severe, and deadly. When possible, these agents should be stopped immediately if infections occur. Unusual infection such as TB and fungal infections can occur. There may be an increased risk of lymphoma with these agents. Other risks can include a multiple sclerosis-like illness and worsening of heart failure. Infusion or injection reactions which can be deadly have been reported. Studies on pregnancy have not been done so pregnancy should be avoided while on these agents. Reactivation of a deadly brain virus and hepatitis viruses have been reported. Worsening of COPD has been seen with orencia. Elevated lipids, elevation in liver functions, and dangerous changes in blood counts have been seen with certain agents. Regular monitoring will be required.       -Generalized osteoarthrosis, involving multiple sites  pips and left elbow and right knee.     -Has tried Celebrex in the past  -Declines addition oral NSAID  -Recommend topical diclofenac as needed     -History of tibial fracture  8/2021  Dr. Soo Parra -  ortho.  Patient with fracture of right tibia while riding a horse in 8/2021  She had surgery at  and a michelle was placed down tibia.    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     This has resolved on Humira            1. Psoriatic arthritis of multiple joints    2. High risk medication use    3. Immunosuppression due to drug therapy        Assessment & Plan        Orders Placed This Encounter   Procedures    CBC Auto Differential    Comprehensive Metabolic Panel    C-reactive Protein    Sedimentation Rate    QuantiFERON-TB Gold Plus (Li-Hep)     New Medications Ordered This Visit   Medications    Adalimumab (Humira, 2 Pen,) 40 MG/0.8ML Auto-injector Kit     Sig: Inject 40 mg under the skin into the appropriate area as directed Every 14 (Fourteen) Days.     Dispense:  2 each     Refill:  5       Follow Up:   Return in about 6 months (around 11/29/2025).      Discussed plan of care in detail with the patient today.  Patient verbalized understanding and agrees.    I confirm accuracy of unchanged data/findings which have been carried forward from previous visit.  I have updated appropriately those that have changed.        Mike Arteaga MD  [facility] [place]

## 2025-05-31 LAB
GAMMA INTERFERON BACKGROUND BLD IA-ACNC: 0.06 IU/ML
M TB IFN-G BLD-IMP: NEGATIVE
M TB IFN-G CD4+ BCKGRND COR BLD-ACNC: 0.07 IU/ML
M TB IFN-G CD4+CD8+ BCKGRND COR BLD-ACNC: 0.06 IU/ML
MITOGEN IGNF BCKGRD COR BLD-ACNC: >10 IU/ML
QUANTIFERON INCUBATION: NORMAL
SERVICE CMNT-IMP: NORMAL